# Patient Record
Sex: MALE | Race: WHITE | NOT HISPANIC OR LATINO | Employment: FULL TIME | URBAN - NONMETROPOLITAN AREA
[De-identification: names, ages, dates, MRNs, and addresses within clinical notes are randomized per-mention and may not be internally consistent; named-entity substitution may affect disease eponyms.]

---

## 2018-01-09 ENCOUNTER — HOSPITAL ENCOUNTER (EMERGENCY)
Facility: HOSPITAL | Age: 33
Discharge: HOME | End: 2018-01-10
Attending: INTERNAL MEDICINE | Admitting: INTERNAL MEDICINE

## 2018-01-09 ENCOUNTER — APPOINTMENT (OUTPATIENT)
Dept: GENERAL RADIOLOGY | Facility: HOSPITAL | Age: 33
End: 2018-01-09

## 2018-01-09 ENCOUNTER — APPOINTMENT (OUTPATIENT)
Dept: ULTRASOUND IMAGING | Facility: HOSPITAL | Age: 33
End: 2018-01-09

## 2018-01-09 DIAGNOSIS — R12 HEARTBURN: Primary | ICD-10-CM

## 2018-01-09 DIAGNOSIS — F41.9 ANXIETY: ICD-10-CM

## 2018-01-09 DIAGNOSIS — R10.11 RUQ PAIN: ICD-10-CM

## 2018-01-09 LAB
6-ACETYL MORPHINE: NEGATIVE
ALBUMIN SERPL-MCNC: 4.3 G/DL (ref 3.5–5)
ALBUMIN/GLOB SERPL: 1.5 G/DL (ref 1.5–2.5)
ALP SERPL-CCNC: 92 U/L (ref 40–129)
ALT SERPL W P-5'-P-CCNC: 22 U/L (ref 10–44)
AMPHET+METHAMPHET UR QL: NEGATIVE
ANION GAP SERPL CALCULATED.3IONS-SCNC: 7 MMOL/L (ref 3.6–11.2)
AST SERPL-CCNC: 21 U/L (ref 10–34)
BARBITURATES UR QL SCN: NEGATIVE
BASOPHILS # BLD AUTO: 0.05 10*3/MM3 (ref 0–0.3)
BASOPHILS NFR BLD AUTO: 0.6 % (ref 0–2)
BENZODIAZ UR QL SCN: NEGATIVE
BILIRUB SERPL-MCNC: 0.9 MG/DL (ref 0.2–1.8)
BILIRUB UR QL STRIP: NEGATIVE
BUN BLD-MCNC: 10 MG/DL (ref 7–21)
BUN/CREAT SERPL: 11.6 (ref 7–25)
BUPRENORPHINE SERPL-MCNC: NEGATIVE NG/ML
CALCIUM SPEC-SCNC: 9.2 MG/DL (ref 7.7–10)
CANNABINOIDS SERPL QL: NEGATIVE
CHLORIDE SERPL-SCNC: 106 MMOL/L (ref 99–112)
CLARITY UR: CLEAR
CO2 SERPL-SCNC: 28 MMOL/L (ref 24.3–31.9)
COCAINE UR QL: NEGATIVE
COLOR UR: YELLOW
CREAT BLD-MCNC: 0.86 MG/DL (ref 0.43–1.29)
D DIMER PPP FEU-MCNC: <0.27 MCGFEU/ML (ref 0–0.5)
DEPRECATED RDW RBC AUTO: 40.2 FL (ref 37–54)
EOSINOPHIL # BLD AUTO: 0.17 10*3/MM3 (ref 0–0.7)
EOSINOPHIL NFR BLD AUTO: 2.2 % (ref 0–5)
ERYTHROCYTE [DISTWIDTH] IN BLOOD BY AUTOMATED COUNT: 13 % (ref 11.5–14.5)
ETHANOL BLD-MCNC: <10 MG/DL
ETHANOL UR QL: <0.01 %
FLUAV AG NPH QL: NEGATIVE
FLUBV AG NPH QL IA: NEGATIVE
GFR SERPL CREATININE-BSD FRML MDRD: 104 ML/MIN/1.73
GLOBULIN UR ELPH-MCNC: 2.8 GM/DL
GLUCOSE BLD-MCNC: 103 MG/DL (ref 70–110)
GLUCOSE UR STRIP-MCNC: NEGATIVE MG/DL
HCT VFR BLD AUTO: 44.5 % (ref 42–52)
HGB BLD-MCNC: 15.3 G/DL (ref 14–18)
HGB UR QL STRIP.AUTO: NEGATIVE
IMM GRANULOCYTES # BLD: 0.02 10*3/MM3 (ref 0–0.03)
IMM GRANULOCYTES NFR BLD: 0.3 % (ref 0–0.5)
INR PPP: 0.99 (ref 0.9–1.1)
KETONES UR QL STRIP: NEGATIVE
LEUKOCYTE ESTERASE UR QL STRIP.AUTO: NEGATIVE
LIPASE SERPL-CCNC: 45 U/L (ref 13–60)
LYMPHOCYTES # BLD AUTO: 3.28 10*3/MM3 (ref 1–3)
LYMPHOCYTES NFR BLD AUTO: 41.5 % (ref 21–51)
MCH RBC QN AUTO: 29.3 PG (ref 27–33)
MCHC RBC AUTO-ENTMCNC: 34.4 G/DL (ref 33–37)
MCV RBC AUTO: 85.2 FL (ref 80–94)
METHADONE UR QL SCN: NEGATIVE
MONOCYTES # BLD AUTO: 0.71 10*3/MM3 (ref 0.1–0.9)
MONOCYTES NFR BLD AUTO: 9 % (ref 0–10)
NEUTROPHILS # BLD AUTO: 3.67 10*3/MM3 (ref 1.4–6.5)
NEUTROPHILS NFR BLD AUTO: 46.4 % (ref 30–70)
NITRITE UR QL STRIP: NEGATIVE
OPIATES UR QL: NEGATIVE
OSMOLALITY SERPL CALC.SUM OF ELEC: 280.6 MOSM/KG (ref 273–305)
OXYCODONE UR QL SCN: NEGATIVE
PCP UR QL SCN: NEGATIVE
PH UR STRIP.AUTO: 7 [PH] (ref 5–8)
PLATELET # BLD AUTO: 277 10*3/MM3 (ref 130–400)
PMV BLD AUTO: 10.3 FL (ref 6–10)
POTASSIUM BLD-SCNC: 4.2 MMOL/L (ref 3.5–5.3)
PROT SERPL-MCNC: 7.1 G/DL (ref 6–8)
PROT UR QL STRIP: NEGATIVE
PROTHROMBIN TIME: 13.2 SECONDS (ref 11–15.4)
RBC # BLD AUTO: 5.22 10*6/MM3 (ref 4.7–6.1)
SODIUM BLD-SCNC: 141 MMOL/L (ref 135–153)
SP GR UR STRIP: 1.02 (ref 1–1.03)
TROPONIN I SERPL-MCNC: <0.006 NG/ML
UROBILINOGEN UR QL STRIP: NORMAL
WBC NRBC COR # BLD: 7.9 10*3/MM3 (ref 4.5–12.5)

## 2018-01-09 PROCEDURE — 83690 ASSAY OF LIPASE: CPT | Performed by: PHYSICIAN ASSISTANT

## 2018-01-09 PROCEDURE — 87804 INFLUENZA ASSAY W/OPTIC: CPT | Performed by: PHYSICIAN ASSISTANT

## 2018-01-09 PROCEDURE — 85379 FIBRIN DEGRADATION QUANT: CPT | Performed by: PHYSICIAN ASSISTANT

## 2018-01-09 PROCEDURE — 82553 CREATINE MB FRACTION: CPT | Performed by: PHYSICIAN ASSISTANT

## 2018-01-09 PROCEDURE — 76705 ECHO EXAM OF ABDOMEN: CPT

## 2018-01-09 PROCEDURE — 80307 DRUG TEST PRSMV CHEM ANLYZR: CPT | Performed by: PHYSICIAN ASSISTANT

## 2018-01-09 PROCEDURE — 82550 ASSAY OF CK (CPK): CPT | Performed by: PHYSICIAN ASSISTANT

## 2018-01-09 PROCEDURE — 71046 X-RAY EXAM CHEST 2 VIEWS: CPT

## 2018-01-09 PROCEDURE — 93010 ELECTROCARDIOGRAM REPORT: CPT | Performed by: INTERNAL MEDICINE

## 2018-01-09 PROCEDURE — 81003 URINALYSIS AUTO W/O SCOPE: CPT | Performed by: PHYSICIAN ASSISTANT

## 2018-01-09 PROCEDURE — 84484 ASSAY OF TROPONIN QUANT: CPT | Performed by: PHYSICIAN ASSISTANT

## 2018-01-09 PROCEDURE — 93005 ELECTROCARDIOGRAM TRACING: CPT | Performed by: PHYSICIAN ASSISTANT

## 2018-01-09 PROCEDURE — 36415 COLL VENOUS BLD VENIPUNCTURE: CPT

## 2018-01-09 PROCEDURE — 76705 ECHO EXAM OF ABDOMEN: CPT | Performed by: RADIOLOGY

## 2018-01-09 PROCEDURE — 80053 COMPREHEN METABOLIC PANEL: CPT | Performed by: PHYSICIAN ASSISTANT

## 2018-01-09 PROCEDURE — 85025 COMPLETE CBC W/AUTO DIFF WBC: CPT | Performed by: PHYSICIAN ASSISTANT

## 2018-01-09 PROCEDURE — 85610 PROTHROMBIN TIME: CPT | Performed by: PHYSICIAN ASSISTANT

## 2018-01-09 PROCEDURE — 99285 EMERGENCY DEPT VISIT HI MDM: CPT

## 2018-01-09 PROCEDURE — 93005 ELECTROCARDIOGRAM TRACING: CPT | Performed by: INTERNAL MEDICINE

## 2018-01-09 PROCEDURE — 71046 X-RAY EXAM CHEST 2 VIEWS: CPT | Performed by: RADIOLOGY

## 2018-01-09 RX ORDER — SODIUM CHLORIDE 0.9 % (FLUSH) 0.9 %
10 SYRINGE (ML) INJECTION AS NEEDED
Status: DISCONTINUED | OUTPATIENT
Start: 2018-01-09 | End: 2018-01-10 | Stop reason: HOSPADM

## 2018-01-09 RX ORDER — RANITIDINE 150 MG/1
150 TABLET ORAL 2 TIMES DAILY
COMMUNITY

## 2018-01-09 RX ORDER — ASPIRIN 81 MG/1
324 TABLET, CHEWABLE ORAL ONCE
Status: COMPLETED | OUTPATIENT
Start: 2018-01-09 | End: 2018-01-09

## 2018-01-09 RX ADMIN — ASPIRIN 324 MG: 81 TABLET, CHEWABLE ORAL at 21:40

## 2018-01-10 VITALS
BODY MASS INDEX: 29.4 KG/M2 | OXYGEN SATURATION: 98 % | WEIGHT: 210 LBS | DIASTOLIC BLOOD PRESSURE: 71 MMHG | SYSTOLIC BLOOD PRESSURE: 118 MMHG | TEMPERATURE: 98.2 F | HEART RATE: 57 BPM | HEIGHT: 71 IN | RESPIRATION RATE: 16 BRPM

## 2018-01-10 LAB
CK MB SERPL-CCNC: 0.95 NG/ML (ref 0–5)
CK MB SERPL-RTO: 0.8 % (ref 0–3)
CK SERPL-CCNC: 112 U/L (ref 24–204)
TROPONIN I SERPL-MCNC: <0.006 NG/ML

## 2018-01-10 NOTE — ED NOTES
Patient lying in bed resting. Skin warm and dry to touch. NADN. WCTM. Resps even and unlabored. Noted to be lying in bed playing on cell phone at this time. Spouse remains at bedside. Vitals WNL. NSR with a rate of 64. Call light within reach.      Justine Lakhani RN  01/09/18 0050

## 2018-01-10 NOTE — ED NOTES
"Patient presents to the ER tonight due to chest tightness and shortness of breath x a week and a half. Patient states that he has had an infrequent cough, states that today he has had multiple episodes of anxiety due to feeling like he cannot breathe. Patient states that he is normally healthy, states that he takes a \"heartburn\" pill at home. Patient is alert and oriented x4, breath sounds are clear and equal bilaterally in all fields, respirations are even and unlabored at this time. Patient's family at bedside, updated on plan of care, NADN, will continue to monitor.     Chris Toth RN  01/09/18 0515    "

## 2018-01-10 NOTE — ED PROVIDER NOTES
Subjective   HPI Comments: Pt presents to the ED with complaints of chest tightness and pressure with SOB. Patient reports a lot of anxiety today. Patient states that he is also having some upper abdominal discomfort. Patient takes ranitidine BID for heartburn. Patient states he has not seen a PCP in over 10 yrs. Had heart surgery for repair of COA at age 5 otherwise negative medical hx. Patient states that he has not had any cardiac problems since then.  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 Pt smokes 1/2-1ppd.     Patient is a 32 y.o. male presenting with chest pain.   History provided by:  Patient   used: No    Chest Pain   Pain location:  Substernal area  Pain quality: tightness    Pain radiates to:  Does not radiate  Pain severity:  Moderate  Onset quality:  Sudden  Timing:  Constant  Progression:  Worsening  Chronicity:  New  Relieved by:  Nothing  Worsened by:  Nothing  Ineffective treatments:  None tried  Risk factors: aortic disease, male sex and smoking        Review of Systems   Constitutional: Negative.    HENT: Negative.    Eyes: Negative.    Respiratory: Negative.    Cardiovascular: Positive for chest pain.   Gastrointestinal: Negative.    Endocrine: Negative.    Genitourinary: Negative.    Musculoskeletal: Negative.    Skin: Negative.    Allergic/Immunologic: Negative.    Neurological: Negative.    Hematological: Negative.    Psychiatric/Behavioral: Negative.    All other systems reviewed and are negative.      History reviewed. No pertinent past medical history.    Allergies   Allergen Reactions   • Sulfa Antibiotics Itching   • Penicillins Rash       Past Surgical History:   Procedure Laterality Date   • CARDIAC SURGERY     • ORIF TIBIA FRACTURE         History reviewed. No pertinent family history.    Social History     Social History   • Marital status:      Spouse name: N/A   • Number of children: N/A   • Years of education: N/A     Social History Main  "Topics   • Smoking status: Current Some Day Smoker     Types: Cigarettes   • Smokeless tobacco: None      Comment: patient states he smokes \"occasionally\"   • Alcohol use No   • Drug use: No   • Sexual activity: Not Asked     Other Topics Concern   • None     Social History Narrative   • None           Objective   Physical Exam   Constitutional: He is oriented to person, place, and time. He appears well-developed and well-nourished.   HENT:   Head: Normocephalic and atraumatic.   Right Ear: External ear normal.   Left Ear: External ear normal.   Nose: Nose normal.   Mouth/Throat: Oropharynx is clear and moist.   Eyes: Conjunctivae and EOM are normal. Pupils are equal, round, and reactive to light.   Neck: Normal range of motion. Neck supple.   Cardiovascular: Normal rate, regular rhythm, normal heart sounds and intact distal pulses.    Pulmonary/Chest: Effort normal and breath sounds normal.   Abdominal: Soft. Bowel sounds are normal. There is tenderness.   RUQ and epigastric TTP    Musculoskeletal: Normal range of motion.   Neurological: He is alert and oriented to person, place, and time.   Skin: Skin is warm and dry.   Nursing note and vitals reviewed.      Procedures         ED Course  ED Course   Value Comment By Time   ECG 12 Lead 2040- Reviewed by Dr. Walter, rate 64, NSR, early repolarization REG Lazaro 01/09 2126    Patient states that he is feeling better. Instructed pt to f/u with GI specialist regarding heartburn. Discussed sx that would warrant return to the ED. REG Lazaro 01/10 0016                  [facility]    Final diagnoses:   Heartburn   RUQ pain   Anxiety            REG Lazaro  01/10/18 0018       REG Lazaro  01/10/18 0019    "

## 2019-07-02 ENCOUNTER — OFFICE VISIT (OUTPATIENT)
Dept: PSYCHIATRY | Facility: CLINIC | Age: 34
End: 2019-07-02

## 2019-07-02 DIAGNOSIS — F41.1 GENERALIZED ANXIETY DISORDER: Primary | ICD-10-CM

## 2019-07-02 PROCEDURE — 90837 PSYTX W PT 60 MINUTES: CPT | Performed by: COUNSELOR

## 2019-07-02 NOTE — PROGRESS NOTES
"Subjective   Cedric Hawk is a 33 y.o. male who is here today for initial behavioral health evaluation starting at 9:15 am and ending at 10:15 am.  He rated  His anxiety at 2-3 daily peeking to 7/10 (with 10 being the most) once or twice/week.  He denies being depressed.    Chief Complaint:  \"My biggest worry is about caring for my kids.  I could take losing my job, our house if it was just me but I want them to have nice things.\"    History of Present Illness:  Patient reported anxiety and panic\" seemed to come out of nowhere\" in Apr 2018 after he and his wife had signed the papers on the mortgage for their house.  He feared he was having a heart attack and spent 13 hours in the ER with a whole cardiac workup only to have the physician report that his heart was healthy and labs all within normal limits. He was unable to work after this episode for 3 months.  He lost income and felt ashamed to share this, feeling \"stupid.\"  Prior to this he never experienced daily anxiety.  He believes the trigger was waking up realizing that all the time he was spending away from his wife and children is time he will never get back.  He admitted that he suffers from separation anxiety and even feels guilty leaving his children behind if he takes his wife out alone to give her a break from being a stay at home mother.  He said she feels the same way but he believes he has dealt with it and has tried to have meaningful quality time with his children when he is not working.  He reported that his father had to travel a lot out of state with his job but never missed any of his games and perhaps he was triggered by comparing himself with his father.  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      Past Psych History:  5 outpatient therapy sessions, no psychiatric hospitalizations    Substance Abuse:  Patient admitted that he has " "\"battled alcohol on and off for several years.\"  He denies using illegal drugs.  He never considered alcohol a problem until he met his wife who at the time drank alcohol occasionally.  Previously he never considered himself an alcoholic however his wife left him for 2 weeks 2 years ago due to her concerns.  He admitted that drinking is incompatible with his values. Four to five months ago he started drinking again 3-4 times/week but has increased to drinking 1-5 beers/day or more if they were 4-wheeling and they she would have to drive.  He also uses chewing tobacco.    Social History:  Patient reported a stable childhood as an only child with nurturing parents who have been  for 38 years. He looked up to his father as a positive role-model.  He and his wife have been together for 7 1/2 years and happily  for 6.  They are raising her 8 y/o daughter from a high school affair.  \"She has always been with me and said she wishes I were her real daddy.\"  The couple have 2 children ages 4 and 3 and his wife is expecting their new baby in October.  Patient reported having uncles suffering alcoholism and cousins with drug addictions.    Legal History:  Patient admitted he would not have shared this information if he had not been asked.  He has been jailed twice.  The first time at age 23 when he and his friend were pulled over returning home after a party.  He reported that his friend was driving and put in [facility] and he knew the  and volunteered to be put in [facility] as well.  He was there for 6 hours, his father refused to bail him out.  He admitted driving his 4-ford on the road and getting away from the police who had identified him and got a warrant so he turned himself in.  He reported only being in custody for 10 minutes.  \"They were very nice to me.  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    Family Psychiatric History:  family history is not on file.    Medical/Surgical History:  History reviewed. No pertinent past medical history.  Past Surgical History:   Procedure Laterality Date   • CARDIAC SURGERY     • ORIF TIBIA FRACTURE         Allergies   Allergen Reactions   • Sulfa Antibiotics Itching   • Penicillins Rash           Current Medications:   Current Outpatient Medications   Medication Sig Dispense Refill   • raNITIdine (ZANTAC) 150 MG tablet Take 150 mg by mouth 2 (Two) Times a Day.       No current facility-administered medications for this visit.          Objective   There were no vitals taken for this visit.    Mental Status Exam:   Hygiene:   good  Cooperation:  Cooperative  Eye Contact:  Good  Psychomotor Behavior:  Appropriate  Affect:  Full range  Hopelessness: Denies  Speech:  Normal  Thought Process:  Goal directed  Thought Content:  Normal  Suicidal:  None  Homicidal:  None  Hallucinations:  None  Delusion:  None  Memory:  Intact  Orientation:  Person, Place, Time and Situation  Reliability:  good  Insight:  Good  Judgement:  Good  Impulse Control:  Impaired  Physical/Medical Issues:  No       DIAGNOSTIC IMPRESSION:   Encounter Diagnosis   Name Primary?   • Generalized anxiety disorder Yes       PROBLEM LIST:       STRENGTHS:   Patient appears motivated for treatment is currently engaged and compliant.  He is grateful for having a stable childhood, good job, supportive family, and adequate finances. He enjoys riding his motor cycle, doing yard work, riding his 4 ford with his family in nature, watching TV and listening to music.   \"I lead the American lifestyle dream.\"  Patient has a sensitive conscience and is concerned asking himself, \"Am I the person God will be pleased with?\"    WEAKNESSES:  Ineffective coping skills, prone to anxiety    SHORT-TERM GOALS: Patient will be compliant with clinic appointments.  Patient will be engaged in therapy, medication compliant with " minimal side effects. Patient  will report decreased frequency and severity of symptoms.     LONG-TERM GOALS: Patient will have minimal symptoms of with continued medication management. Patient will be compliant with treatment and appointments.       PLAN:   Patient will continue with individual outpatient treatment and pharmacotherapy as scheduled.      The patient was instructed to call clinic as needed or go to ER if in crisis.          This document electronically signed by Hilda Lipscomb, RYDERC, NCC, CSAT    Hilda Lipscomb  Licensed Professional Clinical Counselor  Certified Sexual Addiction Therapist

## 2024-08-03 ENCOUNTER — APPOINTMENT (OUTPATIENT)
Dept: CT IMAGING | Facility: HOSPITAL | Age: 39
End: 2024-08-03
Payer: COMMERCIAL

## 2024-08-03 ENCOUNTER — HOSPITAL ENCOUNTER (EMERGENCY)
Facility: HOSPITAL | Age: 39
Discharge: HOME | End: 2024-08-03
Attending: STUDENT IN AN ORGANIZED HEALTH CARE EDUCATION/TRAINING PROGRAM
Payer: COMMERCIAL

## 2024-08-03 ENCOUNTER — APPOINTMENT (OUTPATIENT)
Dept: GENERAL RADIOLOGY | Facility: HOSPITAL | Age: 39
End: 2024-08-03
Payer: COMMERCIAL

## 2024-08-03 VITALS
BODY MASS INDEX: 30.1 KG/M2 | WEIGHT: 215 LBS | HEART RATE: 99 BPM | SYSTOLIC BLOOD PRESSURE: 121 MMHG | DIASTOLIC BLOOD PRESSURE: 88 MMHG | HEIGHT: 71 IN | OXYGEN SATURATION: 95 % | TEMPERATURE: 98.3 F | RESPIRATION RATE: 13 BRPM

## 2024-08-03 DIAGNOSIS — F10.920 ALCOHOLIC INTOXICATION WITHOUT COMPLICATION: Primary | ICD-10-CM

## 2024-08-03 DIAGNOSIS — E87.6 HYPOKALEMIA: ICD-10-CM

## 2024-08-03 LAB
ALBUMIN SERPL-MCNC: 4.4 G/DL (ref 3.5–5.2)
ALBUMIN/GLOB SERPL: 1.5 G/DL
ALP SERPL-CCNC: 75 U/L (ref 39–117)
ALT SERPL W P-5'-P-CCNC: 17 U/L (ref 1–41)
AMMONIA BLD-SCNC: 28 UMOL/L (ref 16–60)
AMPHET+METHAMPHET UR QL: POSITIVE
AMPHETAMINES UR QL: NEGATIVE
ANION GAP SERPL CALCULATED.3IONS-SCNC: 14.8 MMOL/L (ref 5–15)
APAP SERPL-MCNC: <5 MCG/ML (ref 0–30)
AST SERPL-CCNC: 21 U/L (ref 1–40)
BARBITURATES UR QL SCN: NEGATIVE
BASOPHILS # BLD AUTO: 0.08 10*3/MM3 (ref 0–0.2)
BASOPHILS NFR BLD AUTO: 0.9 % (ref 0–1.5)
BENZODIAZ UR QL SCN: NEGATIVE
BILIRUB SERPL-MCNC: 0.7 MG/DL (ref 0–1.2)
BUN SERPL-MCNC: 10 MG/DL (ref 6–20)
BUN/CREAT SERPL: 10.9 (ref 7–25)
BUPRENORPHINE SERPL-MCNC: NEGATIVE NG/ML
CALCIUM SPEC-SCNC: 8.8 MG/DL (ref 8.6–10.5)
CANNABINOIDS SERPL QL: NEGATIVE
CHLORIDE SERPL-SCNC: 95 MMOL/L (ref 98–107)
CO2 SERPL-SCNC: 23.2 MMOL/L (ref 22–29)
COCAINE UR QL: POSITIVE
CREAT SERPL-MCNC: 0.92 MG/DL (ref 0.76–1.27)
D-LACTATE SERPL-SCNC: 2.4 MMOL/L (ref 0.5–2)
DEPRECATED RDW RBC AUTO: 40.2 FL (ref 37–54)
EGFRCR SERPLBLD CKD-EPI 2021: 110.6 ML/MIN/1.73
EOSINOPHIL # BLD AUTO: 0.13 10*3/MM3 (ref 0–0.4)
EOSINOPHIL NFR BLD AUTO: 1.5 % (ref 0.3–6.2)
ERYTHROCYTE [DISTWIDTH] IN BLOOD BY AUTOMATED COUNT: 13 % (ref 12.3–15.4)
ETHANOL BLD-MCNC: 224 MG/DL (ref 0–10)
ETHANOL UR QL: 0.22 %
FENTANYL UR-MCNC: NEGATIVE NG/ML
GLOBULIN UR ELPH-MCNC: 2.9 GM/DL
GLUCOSE SERPL-MCNC: 106 MG/DL (ref 65–99)
HCT VFR BLD AUTO: 40.5 % (ref 37.5–51)
HGB BLD-MCNC: 14.3 G/DL (ref 13–17.7)
HOLD SPECIMEN: NORMAL
HOLD SPECIMEN: NORMAL
IMM GRANULOCYTES # BLD AUTO: 0.07 10*3/MM3 (ref 0–0.05)
IMM GRANULOCYTES NFR BLD AUTO: 0.8 % (ref 0–0.5)
LYMPHOCYTES # BLD AUTO: 2.62 10*3/MM3 (ref 0.7–3.1)
LYMPHOCYTES NFR BLD AUTO: 30.5 % (ref 19.6–45.3)
MCH RBC QN AUTO: 30.5 PG (ref 26.6–33)
MCHC RBC AUTO-ENTMCNC: 35.3 G/DL (ref 31.5–35.7)
MCV RBC AUTO: 86.4 FL (ref 79–97)
METHADONE UR QL SCN: NEGATIVE
MONOCYTES # BLD AUTO: 0.76 10*3/MM3 (ref 0.1–0.9)
MONOCYTES NFR BLD AUTO: 8.9 % (ref 5–12)
NEUTROPHILS NFR BLD AUTO: 4.92 10*3/MM3 (ref 1.7–7)
NEUTROPHILS NFR BLD AUTO: 57.4 % (ref 42.7–76)
NRBC BLD AUTO-RTO: 0 /100 WBC (ref 0–0.2)
OPIATES UR QL: NEGATIVE
OXYCODONE UR QL SCN: NEGATIVE
PCP UR QL SCN: NEGATIVE
PLATELET # BLD AUTO: 308 10*3/MM3 (ref 140–450)
PMV BLD AUTO: 10.1 FL (ref 6–12)
POTASSIUM SERPL-SCNC: 3.2 MMOL/L (ref 3.5–5.2)
PROT SERPL-MCNC: 7.3 G/DL (ref 6–8.5)
QT INTERVAL: 354 MS
QTC INTERVAL: 465 MS
RBC # BLD AUTO: 4.69 10*6/MM3 (ref 4.14–5.8)
SALICYLATES SERPL-MCNC: <0.3 MG/DL
SODIUM SERPL-SCNC: 133 MMOL/L (ref 136–145)
TRICYCLICS UR QL SCN: NEGATIVE
TROPONIN T SERPL HS-MCNC: <6 NG/L
WBC NRBC COR # BLD AUTO: 8.58 10*3/MM3 (ref 3.4–10.8)
WHOLE BLOOD HOLD SPECIMEN: NORMAL

## 2024-08-03 PROCEDURE — 25810000003 SODIUM CHLORIDE 0.9 % SOLUTION: Performed by: STUDENT IN AN ORGANIZED HEALTH CARE EDUCATION/TRAINING PROGRAM

## 2024-08-03 PROCEDURE — 80307 DRUG TEST PRSMV CHEM ANLYZR: CPT | Performed by: STUDENT IN AN ORGANIZED HEALTH CARE EDUCATION/TRAINING PROGRAM

## 2024-08-03 PROCEDURE — 93005 ELECTROCARDIOGRAM TRACING: CPT | Performed by: STUDENT IN AN ORGANIZED HEALTH CARE EDUCATION/TRAINING PROGRAM

## 2024-08-03 PROCEDURE — 96374 THER/PROPH/DIAG INJ IV PUSH: CPT

## 2024-08-03 PROCEDURE — 80179 DRUG ASSAY SALICYLATE: CPT | Performed by: STUDENT IN AN ORGANIZED HEALTH CARE EDUCATION/TRAINING PROGRAM

## 2024-08-03 PROCEDURE — 70450 CT HEAD/BRAIN W/O DYE: CPT | Performed by: RADIOLOGY

## 2024-08-03 PROCEDURE — 96361 HYDRATE IV INFUSION ADD-ON: CPT

## 2024-08-03 PROCEDURE — 70450 CT HEAD/BRAIN W/O DYE: CPT

## 2024-08-03 PROCEDURE — 80053 COMPREHEN METABOLIC PANEL: CPT | Performed by: STUDENT IN AN ORGANIZED HEALTH CARE EDUCATION/TRAINING PROGRAM

## 2024-08-03 PROCEDURE — 80143 DRUG ASSAY ACETAMINOPHEN: CPT | Performed by: STUDENT IN AN ORGANIZED HEALTH CARE EDUCATION/TRAINING PROGRAM

## 2024-08-03 PROCEDURE — 71045 X-RAY EXAM CHEST 1 VIEW: CPT

## 2024-08-03 PROCEDURE — 85025 COMPLETE CBC W/AUTO DIFF WBC: CPT | Performed by: STUDENT IN AN ORGANIZED HEALTH CARE EDUCATION/TRAINING PROGRAM

## 2024-08-03 PROCEDURE — 99284 EMERGENCY DEPT VISIT MOD MDM: CPT

## 2024-08-03 PROCEDURE — 84484 ASSAY OF TROPONIN QUANT: CPT | Performed by: STUDENT IN AN ORGANIZED HEALTH CARE EDUCATION/TRAINING PROGRAM

## 2024-08-03 PROCEDURE — 82140 ASSAY OF AMMONIA: CPT | Performed by: STUDENT IN AN ORGANIZED HEALTH CARE EDUCATION/TRAINING PROGRAM

## 2024-08-03 PROCEDURE — 71045 X-RAY EXAM CHEST 1 VIEW: CPT | Performed by: RADIOLOGY

## 2024-08-03 PROCEDURE — 25010000002 NALOXONE HCL 2 MG/2ML SOLUTION PREFILLED SYRINGE: Performed by: STUDENT IN AN ORGANIZED HEALTH CARE EDUCATION/TRAINING PROGRAM

## 2024-08-03 PROCEDURE — 82077 ASSAY SPEC XCP UR&BREATH IA: CPT | Performed by: STUDENT IN AN ORGANIZED HEALTH CARE EDUCATION/TRAINING PROGRAM

## 2024-08-03 PROCEDURE — 83605 ASSAY OF LACTIC ACID: CPT | Performed by: STUDENT IN AN ORGANIZED HEALTH CARE EDUCATION/TRAINING PROGRAM

## 2024-08-03 RX ORDER — SODIUM CHLORIDE 0.9 % (FLUSH) 0.9 %
10 SYRINGE (ML) INJECTION AS NEEDED
Status: DISCONTINUED | OUTPATIENT
Start: 2024-08-03 | End: 2024-08-03 | Stop reason: HOSPADM

## 2024-08-03 RX ORDER — POTASSIUM CHLORIDE 20 MEQ/1
40 TABLET, EXTENDED RELEASE ORAL ONCE
Status: COMPLETED | OUTPATIENT
Start: 2024-08-03 | End: 2024-08-03

## 2024-08-03 RX ORDER — NALOXONE HYDROCHLORIDE 1 MG/ML
2 INJECTION INTRAMUSCULAR; INTRAVENOUS; SUBCUTANEOUS ONCE
Status: COMPLETED | OUTPATIENT
Start: 2024-08-03 | End: 2024-08-03

## 2024-08-03 RX ADMIN — NALOXONE HYDROCHLORIDE 2 MG: 1 INJECTION PARENTERAL at 01:24

## 2024-08-03 RX ADMIN — SODIUM CHLORIDE 1000 ML: 9 INJECTION, SOLUTION INTRAVENOUS at 01:28

## 2024-08-03 RX ADMIN — POTASSIUM CHLORIDE 40 MEQ: 1500 TABLET, EXTENDED RELEASE ORAL at 04:41

## 2024-08-03 NOTE — ED PROVIDER NOTES
"Subjective   History of Present Illness  Pt is a 38-year-old male that presented to the ER with his father to check on a friend after being out drinking for several hours.    History of present illness is provided father as patient is grossly intoxicated on arrival and at the time of assessment of this patient was not responding or answering questions.    Father states that him and the patient were discussing his friend's clinical presentation requiring being brought to the ER and the patient started hyperventilating and acutely passed out.  At this point in time, security was notified that the patient was unresponsive outside and family member was requesting help.    Patient was wheeled back in EMS stretcher and was unresponsive but arousable to deep painful stimuli but had shallow respiratory effort.  Patient had a pulse and still breathing on room air.    Review of [facility] shows that patient prescribed Adipex and occasional prescription for diazepam.      Review of Systems    No past medical history on file.    Allergies   Allergen Reactions    Sulfa Antibiotics Itching    Penicillins Rash       Past Surgical History:   Procedure Laterality Date    CARDIAC SURGERY      ORIF TIBIA FRACTURE         No family history on file.    Social History     Socioeconomic History    Marital status:    Tobacco Use    Smoking status: Some Days     Types: Cigarettes    Tobacco comments:     patient states he smokes \"occasionally\"   Substance and Sexual Activity    Alcohol use: No    Drug use: No           Objective   Physical Exam  Vitals and nursing note reviewed.   Constitutional:       General: He is not in acute distress.     Appearance: He is well-developed. He is not diaphoretic.      Comments: Patient will arouse to deep painful stimuli, smells heavily of alcohol and appears grossly intoxicated.       HENT:      Head: Normocephalic and atraumatic.      Right Ear: External ear normal.      Left Ear: External ear " normal.      Nose: Nose normal.   Eyes:      Extraocular Movements: Extraocular movements intact.      Conjunctiva/sclera: Conjunctivae normal.      Pupils: Pupils are equal, round, and reactive to light.   Neck:      Vascular: No JVD.      Trachea: No tracheal deviation.   Cardiovascular:      Rate and Rhythm: Normal rate and regular rhythm.      Heart sounds: Normal heart sounds. No murmur heard.  Pulmonary:      Effort: Pulmonary effort is normal. No respiratory distress.      Breath sounds: Normal breath sounds. No wheezing.   Abdominal:      General: Bowel sounds are normal.      Palpations: Abdomen is soft.      Tenderness: There is no abdominal tenderness.   Musculoskeletal:         General: No deformity. Normal range of motion.      Cervical back: Normal range of motion and neck supple.   Skin:     General: Skin is warm and dry.      Coloration: Skin is not pale.      Findings: No erythema or rash.   Neurological:      Mental Status: He is unresponsive.      GCS: GCS eye subscore is 2. GCS verbal subscore is 3. GCS motor subscore is 5.      Cranial Nerves: No cranial nerve deficit.      Comments: Spontaneously moves all extremities; withdraws from painful stimuli.   Psychiatric:         Behavior: Behavior normal.         Thought Content: Thought content normal.         Procedures       Results for orders placed or performed during the hospital encounter of 08/03/24   Comprehensive Metabolic Panel    Specimen: Blood   Result Value Ref Range    Glucose 106 (H) 65 - 99 mg/dL    BUN 10 6 - 20 mg/dL    Creatinine 0.92 0.76 - 1.27 mg/dL    Sodium 133 (L) 136 - 145 mmol/L    Potassium 3.2 (L) 3.5 - 5.2 mmol/L    Chloride 95 (L) 98 - 107 mmol/L    CO2 23.2 22.0 - 29.0 mmol/L    Calcium 8.8 8.6 - 10.5 mg/dL    Total Protein 7.3 6.0 - 8.5 g/dL    Albumin 4.4 3.5 - 5.2 g/dL    ALT (SGPT) 17 1 - 41 U/L    AST (SGOT) 21 1 - 40 U/L    Alkaline Phosphatase 75 39 - 117 U/L    Total Bilirubin 0.7 0.0 - 1.2 mg/dL    Globulin  2.9 gm/dL    A/G Ratio 1.5 g/dL    BUN/Creatinine Ratio 10.9 7.0 - 25.0    Anion Gap 14.8 5.0 - 15.0 mmol/L    eGFR 110.6 >60.0 mL/min/1.73   Lactic Acid, Plasma    Specimen: Blood   Result Value Ref Range    Lactate 2.4 (C) 0.5 - 2.0 mmol/L   Single High Sensitivity Troponin T    Specimen: Blood   Result Value Ref Range    HS Troponin T <6 <22 ng/L   Ethanol    Specimen: Blood   Result Value Ref Range    Ethanol 224 (H) 0 - 10 mg/dL    Ethanol % 0.224 %   Urine Drug Screen - Urine, Clean Catch    Specimen: Urine, Clean Catch   Result Value Ref Range    THC, Screen, Urine Negative Negative    Phencyclidine (PCP), Urine Negative Negative    Cocaine Screen, Urine Positive (A) Negative    Methamphetamine, Ur Negative Negative    Opiate Screen Negative Negative    Amphetamine Screen, Urine Positive (A) Negative    Benzodiazepine Screen, Urine Negative Negative    Tricyclic Antidepressants Screen Negative Negative    Methadone Screen, Urine Negative Negative    Barbiturates Screen, Urine Negative Negative    Oxycodone Screen, Urine Negative Negative    Buprenorphine, Screen, Urine Negative Negative   Salicylate Level    Specimen: Blood   Result Value Ref Range    Salicylate <0.3 <=30.0 mg/dL   Acetaminophen Level    Specimen: Blood   Result Value Ref Range    Acetaminophen <5.0 0.0 - 30.0 mcg/mL   Ammonia    Specimen: Blood   Result Value Ref Range    Ammonia 28 16 - 60 umol/L   CBC Auto Differential    Specimen: Blood   Result Value Ref Range    WBC 8.58 3.40 - 10.80 10*3/mm3    RBC 4.69 4.14 - 5.80 10*6/mm3    Hemoglobin 14.3 13.0 - 17.7 g/dL    Hematocrit 40.5 37.5 - 51.0 %    MCV 86.4 79.0 - 97.0 fL    MCH 30.5 26.6 - 33.0 pg    MCHC 35.3 31.5 - 35.7 g/dL    RDW 13.0 12.3 - 15.4 %    RDW-SD 40.2 37.0 - 54.0 fl    MPV 10.1 6.0 - 12.0 fL    Platelets 308 140 - 450 10*3/mm3    Neutrophil % 57.4 42.7 - 76.0 %    Lymphocyte % 30.5 19.6 - 45.3 %    Monocyte % 8.9 5.0 - 12.0 %    Eosinophil % 1.5 0.3 - 6.2 %    Basophil %  0.9 0.0 - 1.5 %    Immature Grans % 0.8 (H) 0.0 - 0.5 %    Neutrophils, Absolute 4.92 1.70 - 7.00 10*3/mm3    Lymphocytes, Absolute 2.62 0.70 - 3.10 10*3/mm3    Monocytes, Absolute 0.76 0.10 - 0.90 10*3/mm3    Eosinophils, Absolute 0.13 0.00 - 0.40 10*3/mm3    Basophils, Absolute 0.08 0.00 - 0.20 10*3/mm3    Immature Grans, Absolute 0.07 (H) 0.00 - 0.05 10*3/mm3    nRBC 0.0 0.0 - 0.2 /100 WBC   Fentanyl, Urine - Urine, Clean Catch    Specimen: Urine, Clean Catch   Result Value Ref Range    Fentanyl, Urine Negative Negative   ECG 12 Lead Altered Mental Status   Result Value Ref Range    QT Interval 354 ms    QTC Interval 465 ms   Green Top (Gel)   Result Value Ref Range    Extra Tube Hold for add-ons.    Lavender Top   Result Value Ref Range    Extra Tube hold for add-on    Gold Top - SST   Result Value Ref Range    Extra Tube Hold for add-ons.      XR Chest 1 View   Final Result       1.  Dextroconvex curve at the mid thoracic spine.   2.  Normal heart size.   3.  No lobar consolidation or edema.   4.  No pleural effusion or pneumothorax   5.  No bronchial inflammation.       This report was finalized on 8/3/2024 3:36 AM by Jose Manuel Manzanares MD.          CT Head Without Contrast   Final Result       1.  No acute process seen in the brain.   2.  No acute hemorrhage, mass, infarct, or edema.   3.  Mild mucosal thickening in the paranasal sinuses.           This report was finalized on 8/3/2024 3:30 AM by Jose Manuel Manzanares MD.                ED Course  ED Course as of 08/03/24 0510   Sat Aug 03, 2024   0122 Pt was pulled from personal vehicle unresponsive with shallow respiratory effort. patient still had a palpable pulse.   With deep sternal rub patient did respond to painful stimuli was able to at least tell us his name. [LK]   0322 Patient is positive for amphetamines as well as cocaine. [LK]   0504 Patient's father is excepting responsibility and signed patient out from the ER.  Saw and evaluated the patient prior to  discharge and he was answering questions appropriately and was hemodynamically stable at the time of discharge. [LK]      ED Course User Index  [LK] Janie Pimentel DO                                     [facility] reviewed by Janie Pimentel DO       Medical Decision Making  Problems Addressed:  Alcoholic intoxication without complication: complicated acute illness or injury  Hypokalemia: complicated acute illness or injury    Amount and/or Complexity of Data Reviewed  Labs: ordered.  Radiology: ordered.  ECG/medicine tests: ordered.    Risk  Prescription drug management.        Final diagnoses:   Alcoholic intoxication without complication   Hypokalemia       ED Disposition  ED Disposition       ED Disposition   Discharge    Condition   Stable    Comment   --               Pawan Jacinto MD  [address] [place] [address] [address] [place] [address] [phone]               Medication List      No changes were made to your prescriptions during this visit.            Janie Pimentel DO  08/03/24 0507       Janie Pimentel DO  08/03/24 0506

## 2024-08-03 NOTE — ED NOTES
Pt was transported from outside the ER entrance on a stretcher into his room. Pt responded to a sternal rub and became alert and oriented immediately after.